# Patient Record
Sex: FEMALE | Race: WHITE | Employment: UNEMPLOYED | URBAN - METROPOLITAN AREA
[De-identification: names, ages, dates, MRNs, and addresses within clinical notes are randomized per-mention and may not be internally consistent; named-entity substitution may affect disease eponyms.]

---

## 2017-01-03 ENCOUNTER — TELEPHONE (OUTPATIENT)
Dept: INTERNAL MEDICINE CLINIC | Age: 37
End: 2017-01-03

## 2017-04-07 ENCOUNTER — OFFICE VISIT (OUTPATIENT)
Dept: INTERNAL MEDICINE CLINIC | Age: 37
End: 2017-04-07

## 2017-04-07 VITALS
OXYGEN SATURATION: 98 % | HEIGHT: 69 IN | WEIGHT: 174.8 LBS | HEART RATE: 74 BPM | DIASTOLIC BLOOD PRESSURE: 62 MMHG | SYSTOLIC BLOOD PRESSURE: 98 MMHG | BODY MASS INDEX: 25.89 KG/M2 | TEMPERATURE: 98.4 F

## 2017-04-07 DIAGNOSIS — J06.9 VIRAL UPPER RESPIRATORY TRACT INFECTION: Primary | ICD-10-CM

## 2017-04-07 PROCEDURE — 99212 OFFICE O/P EST SF 10 MIN: CPT | Performed by: INTERNAL MEDICINE

## 2017-04-07 ASSESSMENT — PATIENT HEALTH QUESTIONNAIRE - PHQ9
1. LITTLE INTEREST OR PLEASURE IN DOING THINGS: 1
2. FEELING DOWN, DEPRESSED OR HOPELESS: 1
SUM OF ALL RESPONSES TO PHQ QUESTIONS 1-9: 2
SUM OF ALL RESPONSES TO PHQ9 QUESTIONS 1 & 2: 2

## 2017-11-03 ENCOUNTER — OFFICE VISIT (OUTPATIENT)
Dept: PSYCHOLOGY | Age: 37
End: 2017-11-03

## 2017-11-03 DIAGNOSIS — F41.9 ANXIETY: Primary | ICD-10-CM

## 2017-11-03 PROCEDURE — 90791 PSYCH DIAGNOSTIC EVALUATION: CPT | Performed by: PSYCHOLOGIST

## 2017-11-03 ASSESSMENT — PATIENT HEALTH QUESTIONNAIRE - PHQ9
2. FEELING DOWN, DEPRESSED OR HOPELESS: 2
SUM OF ALL RESPONSES TO PHQ QUESTIONS 1-9: 13
5. POOR APPETITE OR OVEREATING: 0
SUM OF ALL RESPONSES TO PHQ9 QUESTIONS 1 & 2: 3
8. MOVING OR SPEAKING SO SLOWLY THAT OTHER PEOPLE COULD HAVE NOTICED. OR THE OPPOSITE, BEING SO FIGETY OR RESTLESS THAT YOU HAVE BEEN MOVING AROUND A LOT MORE THAN USUAL: 2
4. FEELING TIRED OR HAVING LITTLE ENERGY: 1
3. TROUBLE FALLING OR STAYING ASLEEP: 3
1. LITTLE INTEREST OR PLEASURE IN DOING THINGS: 1
6. FEELING BAD ABOUT YOURSELF - OR THAT YOU ARE A FAILURE OR HAVE LET YOURSELF OR YOUR FAMILY DOWN: 2
10. IF YOU CHECKED OFF ANY PROBLEMS, HOW DIFFICULT HAVE THESE PROBLEMS MADE IT FOR YOU TO DO YOUR WORK, TAKE CARE OF THINGS AT HOME, OR GET ALONG WITH OTHER PEOPLE: 2
7. TROUBLE CONCENTRATING ON THINGS, SUCH AS READING THE NEWSPAPER OR WATCHING TELEVISION: 1
9. THOUGHTS THAT YOU WOULD BE BETTER OFF DEAD, OR OF HURTING YOURSELF: 1

## 2017-11-03 ASSESSMENT — ANXIETY QUESTIONNAIRES
1. FEELING NERVOUS, ANXIOUS, OR ON EDGE: 1-SEVERAL DAYS
GAD7 TOTAL SCORE: 7
5. BEING SO RESTLESS THAT IT IS HARD TO SIT STILL: 1-SEVERAL DAYS
4. TROUBLE RELAXING: 1-SEVERAL DAYS
7. FEELING AFRAID AS IF SOMETHING AWFUL MIGHT HAPPEN: 1-SEVERAL DAYS
2. NOT BEING ABLE TO STOP OR CONTROL WORRYING: 1-SEVERAL DAYS
6. BECOMING EASILY ANNOYED OR IRRITABLE: 1-SEVERAL DAYS
3. WORRYING TOO MUCH ABOUT DIFFERENT THINGS: 1-SEVERAL DAYS

## 2017-11-03 NOTE — PROGRESS NOTES
does not drink alcohol. Family History:   Family History   Problem Relation Age of Onset    Thyroid Disease Other     Cancer Paternal Grandmother     Cancer Paternal Grandfather          A:    See S: above. Denied any si/hi risk. F/u with me for couple consult in 2 weeks. PHQ Scores 11/3/2017 4/7/2017   PHQ2 Score 3 2   PHQ9 Score 13 2     Interpretation of Total Score Depression Severity: 1-4 = Minimal depression, 5-9 = Mild depression, 10-14 = Moderate depression, 15-19 = Moderately severe depression, 20-27 = Severe depression    COLIN 7 SCORE 11/3/2017   COLIN-7 Total Score 7     Interpretation of COLIN-7 score: 5-9 = mild anxiety, 10-14 = moderate anxiety, 15+ = severe anxiety. Recommend referral to behavioral health for scores 10 or greater. Diagnosis: Anxiety      Diagnosis Date    Pulmonary embolus ([address] 2016    post partum after first child    Spontaneous miscarriage 2014    at 12 weeks     Problems with primary support group and Problems related to the social environment      Plan:  Pt interventions:    Discussed self-care (sleep, nutrition, rewarding activities, social support, exercise), Discussed benefits of referral for specialty care, Established rapport, Conducted functional assessment and Supportive techniques    Pt Behavioral Change Plan:    1. Return to clinic in 2 weeks with , couple consult, 60 minutes  2. Consider longer term psychotherapy referral, individual or couple  3.  Return to clinic 2 weeks after consult with  for individual appointment, schedule follow up with Dr. Victor Manuel Mars

## 2017-11-03 NOTE — PATIENT INSTRUCTIONS
1. Return to clinic in 2 weeks with , couple consult, 60 minutes  2. Consider longer term psychotherapy referral, individual or couple  3.  Return to clinic 2 weeks after consult with  for individual appointment, schedule follow up with Dr. Ovi Bedolla

## 2017-11-14 ENCOUNTER — OFFICE VISIT (OUTPATIENT)
Dept: PSYCHOLOGY | Age: 37
End: 2017-11-14

## 2017-11-14 DIAGNOSIS — F41.9 ANXIETY: Primary | ICD-10-CM

## 2017-11-14 PROCEDURE — 90847 FAMILY PSYTX W/PT 50 MIN: CPT | Performed by: PSYCHOLOGIST

## 2017-11-14 ASSESSMENT — ANXIETY QUESTIONNAIRES
6. BECOMING EASILY ANNOYED OR IRRITABLE: 2-OVER HALF THE DAYS
1. FEELING NERVOUS, ANXIOUS, OR ON EDGE: 1-SEVERAL DAYS
7. FEELING AFRAID AS IF SOMETHING AWFUL MIGHT HAPPEN: 2-OVER HALF THE DAYS
GAD7 TOTAL SCORE: 10
3. WORRYING TOO MUCH ABOUT DIFFERENT THINGS: 1-SEVERAL DAYS
2. NOT BEING ABLE TO STOP OR CONTROL WORRYING: 1-SEVERAL DAYS
4. TROUBLE RELAXING: 2-OVER HALF THE DAYS
5. BEING SO RESTLESS THAT IT IS HARD TO SIT STILL: 1-SEVERAL DAYS

## 2017-11-14 ASSESSMENT — PATIENT HEALTH QUESTIONNAIRE - PHQ9
SUM OF ALL RESPONSES TO PHQ QUESTIONS 1-9: 13
SUM OF ALL RESPONSES TO PHQ9 QUESTIONS 1 & 2: 4
1. LITTLE INTEREST OR PLEASURE IN DOING THINGS: 2
5. POOR APPETITE OR OVEREATING: 1
6. FEELING BAD ABOUT YOURSELF - OR THAT YOU ARE A FAILURE OR HAVE LET YOURSELF OR YOUR FAMILY DOWN: 2
7. TROUBLE CONCENTRATING ON THINGS, SUCH AS READING THE NEWSPAPER OR WATCHING TELEVISION: 1
8. MOVING OR SPEAKING SO SLOWLY THAT OTHER PEOPLE COULD HAVE NOTICED. OR THE OPPOSITE, BEING SO FIGETY OR RESTLESS THAT YOU HAVE BEEN MOVING AROUND A LOT MORE THAN USUAL: 1
10. IF YOU CHECKED OFF ANY PROBLEMS, HOW DIFFICULT HAVE THESE PROBLEMS MADE IT FOR YOU TO DO YOUR WORK, TAKE CARE OF THINGS AT HOME, OR GET ALONG WITH OTHER PEOPLE: 2
4. FEELING TIRED OR HAVING LITTLE ENERGY: 1
3. TROUBLE FALLING OR STAYING ASLEEP: 2
9. THOUGHTS THAT YOU WOULD BE BETTER OFF DEAD, OR OF HURTING YOURSELF: 1
2. FEELING DOWN, DEPRESSED OR HOPELESS: 2

## 2017-11-14 NOTE — PROGRESS NOTES
Behavioral Health Consultation Follow-Up  Wander Sharif PsyD  Psychologist  11/14/2017  2:02 PM      Time spent with Patient: 60 minutes  This is patient's second  [facility] appointment. Reason for Consult:  Anxiety  Referring Provider: Kareen Bautista MD  [address] [facility], [address] [facility]    Feedback given to PCP. S:    Met with pt (and , Benedict Little). Marital issues are severe. We focused today on couple's readiness for couple therapy treatment. Spent some time focused on clarifying pt and 's understanding of their marital issues. Emphasized that it will be important for both of them to identify ways they each individually make their own changes to improve marriage. Identified potential barriers to couple therapy. Discussed the benefits of couple scheduling consistent \"fun\" together and how couple therapy will support them being able to break habits of arguing over what the root cause is of pt's medical issues and how they can develop a health repair plan when they have conflict. Both pt and  expressed commitment to wanting to work on marriage. [facility] Pt will call to schedule appt with her asap. F/u with me for pt only, individual consult next week.      O:  MSE:    Appearance    alert, cooperative, crying  Appetite abnormal: poor  Sleep disturbance Yes  Fatigue Yes  Loss of pleasure Yes  Impulsive behavior No  Speech    normal rate, normal volume and well articulated  Mood    Anxious  Guilty  Depressed  Worthless  Low self-esteem  Affect    Congruent with full range  Thought Content    intact  Thought Process    linear, goal directed and coherent  Associations    logical connections  Insight    Good  Judgment    Intact  Orientation    oriented to person, place, time, and general circumstances  Memory    recent and remote memory intact  Attention/Concentration    impaired  Morbid ideation Yes  Suicide Assessment    no suicidal anxiety. Recommend referral to behavioral health for scores 10 or greater. Diagnosis:    COLIN      Diagnosis Date    Pulmonary embolus ([address] 2016    post partum after first child    Spontaneous miscarriage 2014    at 12 weeks     Problems with primary support group, Problems related to the social environment and Occupational problems      Plan:  Pt interventions:    Practiced assertive communication, Discussed self-care (sleep, nutrition, rewarding activities, social support, exercise), Discussed benefits of referral for specialty care, Motivational Interviewing to determine importance and readiness for change, Discussed potential barriers to change and Supportive techniques    Pt Behavioral Change Plan:    1. Call Almaz Driver): [phone] for couple therapy  2.  Arleen will return for individual consult for Dr. Elaine Allen next week

## 2017-11-21 ENCOUNTER — OFFICE VISIT (OUTPATIENT)
Dept: PSYCHOLOGY | Age: 37
End: 2017-11-21

## 2017-11-21 DIAGNOSIS — F41.9 ANXIETY: Primary | ICD-10-CM

## 2017-11-21 PROCEDURE — 90832 PSYTX W PT 30 MINUTES: CPT | Performed by: PSYCHOLOGIST

## 2017-11-21 ASSESSMENT — PATIENT HEALTH QUESTIONNAIRE - PHQ9
3. TROUBLE FALLING OR STAYING ASLEEP: 2
9. THOUGHTS THAT YOU WOULD BE BETTER OFF DEAD, OR OF HURTING YOURSELF: 1
7. TROUBLE CONCENTRATING ON THINGS, SUCH AS READING THE NEWSPAPER OR WATCHING TELEVISION: 1
8. MOVING OR SPEAKING SO SLOWLY THAT OTHER PEOPLE COULD HAVE NOTICED. OR THE OPPOSITE, BEING SO FIGETY OR RESTLESS THAT YOU HAVE BEEN MOVING AROUND A LOT MORE THAN USUAL: 1
5. POOR APPETITE OR OVEREATING: 1
6. FEELING BAD ABOUT YOURSELF - OR THAT YOU ARE A FAILURE OR HAVE LET YOURSELF OR YOUR FAMILY DOWN: 2
SUM OF ALL RESPONSES TO PHQ9 QUESTIONS 1 & 2: 3
10. IF YOU CHECKED OFF ANY PROBLEMS, HOW DIFFICULT HAVE THESE PROBLEMS MADE IT FOR YOU TO DO YOUR WORK, TAKE CARE OF THINGS AT HOME, OR GET ALONG WITH OTHER PEOPLE: 2
4. FEELING TIRED OR HAVING LITTLE ENERGY: 2
SUM OF ALL RESPONSES TO PHQ QUESTIONS 1-9: 13
1. LITTLE INTEREST OR PLEASURE IN DOING THINGS: 1
2. FEELING DOWN, DEPRESSED OR HOPELESS: 2

## 2017-11-21 ASSESSMENT — ANXIETY QUESTIONNAIRES
5. BEING SO RESTLESS THAT IT IS HARD TO SIT STILL: 1-SEVERAL DAYS
4. TROUBLE RELAXING: 1-SEVERAL DAYS
GAD7 TOTAL SCORE: 10
1. FEELING NERVOUS, ANXIOUS, OR ON EDGE: 2-OVER HALF THE DAYS
7. FEELING AFRAID AS IF SOMETHING AWFUL MIGHT HAPPEN: 2-OVER HALF THE DAYS
3. WORRYING TOO MUCH ABOUT DIFFERENT THINGS: 1-SEVERAL DAYS
2. NOT BEING ABLE TO STOP OR CONTROL WORRYING: 2-OVER HALF THE DAYS
6. BECOMING EASILY ANNOYED OR IRRITABLE: 1-SEVERAL DAYS

## 2017-11-21 NOTE — PROGRESS NOTES
Behavioral Health Consultation Follow-Up  Estuardo Godoy PsyD  Psychologist  11/21/2017  10:48 AM      Time spent with Patient: 30 minutes  This is patient's third  [facility] appointment. Reason for Consult:  Anxiety  Referring Provider: Wes Birch MD  [address], [address] [facility]    Feedback given to PCP. S:    Went to [place] clinic, showed positive test for anti-body. RNP came back positive, possible connective tissue issues, could be EDS, lupus, RA, fibromyalgia, or sjogren's. Scheduled with Functional medicine in February. Prescribed effexor 37.5 for 2 weeks, then 2 pills to 75 mg, by Dr. Radha Houston ([address] [place] [address]). Pt agreeable to starting this new medication. Understands it will take 4-6 weeks before full therapeutic benefit. Rheumatology: knows really nice provider in [facility], Dr. Coco Mccarthy in [address] [place], [place]    Couple therapy: pt hasn't scheduled new patient appointment with [address] [place] [address] yet but agreed to schedule this over the next week or so.  Pt unsure if she has the energy to do couple treatment but emphasized it is important to her    O:  MSE:    Appearance    alert, cooperative  Appetite okay  Sleep disturbance a bit better  Fatigue a bit better  Loss of pleasure Yes  Impulsive behavior No  Speech    normal rate, normal volume and well articulated  Mood    Feeling a bit of relief with news that there is positive autoimmune test, feeling hopeful  Affect    Congruent with full range  Thought Content    intact  Thought Process    linear, goal directed and coherent  Associations    logical connections  Insight    Good  Judgment    Intact  Orientation    oriented to person, place, time, and general circumstances  Memory    recent and remote memory intact  Attention/Concentration    intact  Morbid ideation No  Suicide Assessment    no suicidal ideation      History:    Medications:   Current Outpatient Prescriptions   Medication Sig Dispense Refill    clonazePAM (KLONOPIN) 0.5 MG tablet Take 0.5 mg by mouth 2 times daily as needed      FLUoxetine (PROZAC) 10 MG tablet Take 10 mg by mouth daily      LORazepam (ATIVAN) 0.5 MG tablet One to two po qid prn anxiety 40 tablet 0    mometasone (NASONEX) 50 MCG/ACT nasal spray 2 sprays by Nasal route daily 1 Inhaler 3    sertraline (ZOLOFT) 50 MG tablet Take 1 tablet by mouth daily 30 tablet 3    midodrine (PROAMATINE) 10 MG tablet Take 1 tablet by mouth 3 times daily 90 tablet 8     No current facility-administered medications for this visit. 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 She has never used smokeless tobacco.  ETOH:   reports that she does not drink alcohol. Family History:   Family History   Problem Relation Age of Onset    Thyroid Disease Other     Cancer Paternal Grandmother     Cancer Paternal Grandfather          A:    Pt in better spirits today, feeling hopeful after visit to [facility] even though felt rheumatologist was unkind in terms of minimizing a recent positive anti-body. Dr. Edelmira Walter (rhematologist from [facility]) prescribed effexor to help with stress. Pt somewhat annoyed by this but agreeable to start this medication. Will start 37.5 effexor for 2 weeks then taper up to 75 mg. More motivational interviewing interventions around pt moving towards engaging in couple therapy. She will call to schedule new couple therapy appt with Almaz Robin this week. Denied any si/hi risk, intent, or plan.      PHQ Scores 11/21/2017 11/14/2017

## 2017-11-21 NOTE — PATIENT INSTRUCTIONS
1. Start effexor 37.5 mg for 2 weeks then 75 mg per Dr. Neymar Fountain [facility] clinic)  2. Continue to slow down any amount  3. Schedule couple consult with Peg Meis before next appointment  4.  Return to clinic for Dr. Victor Manuel Mars in 4 week or sooner if needed

## 2019-04-30 ENCOUNTER — OFFICE VISIT (OUTPATIENT)
Dept: PSYCHOLOGY | Age: 39
End: 2019-04-30
Payer: COMMERCIAL

## 2019-04-30 DIAGNOSIS — F41.9 ANXIETY: Primary | ICD-10-CM

## 2019-04-30 PROCEDURE — 90832 PSYTX W PT 30 MINUTES: CPT | Performed by: PSYCHOLOGIST

## 2019-04-30 ASSESSMENT — ANXIETY QUESTIONNAIRES
3. WORRYING TOO MUCH ABOUT DIFFERENT THINGS: 2-OVER HALF THE DAYS
4. TROUBLE RELAXING: 2-OVER HALF THE DAYS
5. BEING SO RESTLESS THAT IT IS HARD TO SIT STILL: 2-OVER HALF THE DAYS
1. FEELING NERVOUS, ANXIOUS, OR ON EDGE: 2-OVER HALF THE DAYS
2. NOT BEING ABLE TO STOP OR CONTROL WORRYING: 2-OVER HALF THE DAYS
7. FEELING AFRAID AS IF SOMETHING AWFUL MIGHT HAPPEN: 3-NEARLY EVERY DAY
6. BECOMING EASILY ANNOYED OR IRRITABLE: 2-OVER HALF THE DAYS
GAD7 TOTAL SCORE: 15

## 2019-04-30 ASSESSMENT — PATIENT HEALTH QUESTIONNAIRE - PHQ9
SUM OF ALL RESPONSES TO PHQ QUESTIONS 1-9: 2
1. LITTLE INTEREST OR PLEASURE IN DOING THINGS: 1
SUM OF ALL RESPONSES TO PHQ QUESTIONS 1-9: 2
SUM OF ALL RESPONSES TO PHQ9 QUESTIONS 1 & 2: 2
2. FEELING DOWN, DEPRESSED OR HOPELESS: 1

## 2019-04-30 NOTE — PATIENT INSTRUCTIONS
1. Dr Lexis Rankin will reach out to colleagues for couple therapy referral  2. Continue to slow down any amount  3. Go to Maksim Martinez CNP to address clonazepam refill on 5/6, ask [place] Hammock about refill? 4. Go to Dr Gladstone Homans (functional medicine) on 5/10 in [facility]  5.  Return to clinic for Dr Lexis Rankin in 2-4 weeks
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
## 2019-10-23 ENCOUNTER — HOSPITAL ENCOUNTER (OUTPATIENT)
Dept: CT IMAGING | Age: 39
Discharge: HOME | End: 2019-10-23
Payer: COMMERCIAL

## 2019-10-23 DIAGNOSIS — R10.30 LOWER ABDOMINAL PAIN: ICD-10-CM

## 2019-10-23 PROCEDURE — 74177 CT ABD & PELVIS W/CONTRAST: CPT

## 2019-10-23 PROCEDURE — 6360000004 HC RX CONTRAST MEDICATION: Performed by: INTERNAL MEDICINE

## 2019-10-23 RX ADMIN — IOHEXOL 50 ML: 240 INJECTION, SOLUTION INTRATHECAL; INTRAVASCULAR; INTRAVENOUS; ORAL at 15:10

## 2019-10-23 RX ADMIN — IOPAMIDOL 75 ML: 755 INJECTION, SOLUTION INTRAVENOUS at 15:10

## 2020-06-02 ENCOUNTER — OFFICE VISIT (OUTPATIENT)
Dept: ENT CLINIC | Age: 40
End: 2020-06-02
Payer: COMMERCIAL

## 2020-06-02 VITALS
WEIGHT: 156 LBS | TEMPERATURE: 98.2 F | SYSTOLIC BLOOD PRESSURE: 114 MMHG | HEART RATE: 86 BPM | DIASTOLIC BLOOD PRESSURE: 76 MMHG | HEIGHT: 69 IN | BODY MASS INDEX: 23.11 KG/M2

## 2020-06-02 PROCEDURE — 31231 NASAL ENDOSCOPY DX: CPT | Performed by: OTOLARYNGOLOGY

## 2020-06-02 PROCEDURE — 99243 OFF/OP CNSLTJ NEW/EST LOW 30: CPT | Performed by: OTOLARYNGOLOGY

## 2020-06-02 ASSESSMENT — ENCOUNTER SYMPTOMS
VOICE CHANGE: 0
SINUS PAIN: 0
COLOR CHANGE: 0
TROUBLE SWALLOWING: 0
DIARRHEA: 0
SORE THROAT: 0
SINUS PRESSURE: 1
BLOOD IN STOOL: 0
CHOKING: 0
STRIDOR: 0
EYE ITCHING: 0
FACIAL SWELLING: 0
CONSTIPATION: 0
COUGH: 0
SHORTNESS OF BREATH: 0
WHEEZING: 0
VOMITING: 0
BACK PAIN: 0
EYE DISCHARGE: 0
PHOTOPHOBIA: 0
RHINORRHEA: 1
NAUSEA: 0

## 2020-06-02 NOTE — PROGRESS NOTES
[facility]  [address], [address] [facility], [address] [facility]  P: [phone]  F: [phone]       Patient     Mango Mcgregor  1980    ChiefComplaint     Chief Complaint   Patient presents with    Sinus Problem     Patient is here today because she has drainage that runs down the back of her throat, she has a salty taste around her lips and eyes, she has pressure behind her nose along with headaches, he nasal cavities feel very dry,        History of Present Illness     Vero Cowan is a pleasant 46 y.o. female who presents as a new patient today referred for sinusitis issues. She moved to the area couple years ago. Over the past 6 to 8 months she is been having issues with nasal congestion, nasal obstruction, sneezing, postnasal drainage, sinus pressure. She has not been on any antibiotics. She has been on Flonase which she did not tolerate. She only took it for a few days. She has been on Zyrtec regularly which somewhat helped her symptoms. However she discontinued it recently. Her nose feels significantly dry. She has not been able to clear mucus from her nose. Congestion is persistent. She has not had a CT of her sinuses. She has never been allergy tested.     Past Medical History     Past Medical History:   Diagnosis Date    Palpitations     PVCs    Panic disorder     POTS (postural orthostatic tachycardia syndrome) 2016    came on after birth of child and PE    PTSD (post-traumatic stress disorder)     diagnosed after POTs and PE diagnosed    Pulmonary embolus ([address] 2016    post partum after first child    Spontaneous miscarriage 2014    at 12 weeks       Past Surgical History     Past Surgical History:   Procedure Laterality Date     SECTION      DILATION AND CURETTAGE OF UTERUS  2014       Family History     Family History   Problem Relation Age of Onset    Thyroid Disease Other     Cancer Paternal Grandmother     Cancer Paternal Grandfather        Social erythema, left periorbital erythema or laceration. Hair is normal.      Jaw: No trismus. Right Ear: Hearing, tympanic membrane and external ear normal. No decreased hearing noted. No drainage, swelling or tenderness. No middle ear effusion. No mastoid tenderness. Tympanic membrane is not perforated, retracted or bulging. Tympanic membrane has normal mobility. Left Ear: Hearing, tympanic membrane and external ear normal. No decreased hearing noted. No drainage, swelling or tenderness. No middle ear effusion. No mastoid tenderness. Tympanic membrane is not perforated, retracted or bulging. Tympanic membrane has normal mobility. Nose: No nasal deformity, septal deviation, laceration, mucosal edema or rhinorrhea. Right Turbinates: Swollen and pale. Left Turbinates: Swollen and pale. Right Sinus: No maxillary sinus tenderness or frontal sinus tenderness. Left Sinus: No maxillary sinus tenderness or frontal sinus tenderness. Mouth/Throat:      Mouth: Mucous membranes are not pale, not dry and not cyanotic. No lacerations or oral lesions. Dentition: Normal dentition. No dental caries or dental abscesses. Pharynx: Uvula midline. No oropharyngeal exudate, posterior oropharyngeal erythema or uvula swelling. Tonsils: No tonsillar abscesses. Eyes:      General: Lids are normal.         Right eye: No discharge. Left eye: No discharge. Extraocular Movements:      Right eye: Normal extraocular motion and no nystagmus. Left eye: Normal extraocular motion and no nystagmus. Conjunctiva/sclera:      Right eye: No chemosis or exudate. Left eye: No chemosis or exudate. Neck:      Musculoskeletal: Neck supple. Thyroid: No thyroid mass or thyromegaly. Vascular: Normal carotid pulses. Trachea: No tracheal tenderness or tracheal deviation. Cardiovascular:      Rate and Rhythm: Normal rate and regular rhythm.    Pulmonary:      Effort: No month duration. I will see her back to assess improvement of her symptoms. Other options in the future include antihistamine nasal spray, CT of the sinus, allergy testing. 2. Nasal turbinate hypertrophy      3. Chronic sinusitis, unspecified location        Return in about 4 weeks (around 6/30/2020). Portions of this note were dictated using Dragon.  There may be linguistic errors secondary to the use of this program.

## 2020-06-09 ENCOUNTER — TELEPHONE (OUTPATIENT)
Dept: ADMINISTRATIVE | Age: 40
End: 2020-06-09

## 2020-06-09 NOTE — TELEPHONE ENCOUNTER
Submitted PA for Emverm 100MG chewable tablets, Key: [id number]. Medication has been APPROVED through 06/09/2021. Please notify patient. Thank you.

## 2020-09-16 ENCOUNTER — NURSE TRIAGE (OUTPATIENT)
Dept: OTHER | Facility: CLINIC | Age: 40
End: 2020-09-16

## 2020-09-16 NOTE — TELEPHONE ENCOUNTER
Reason for Disposition   Finished taking antibiotics and symptoms are BETTER but are not completely gone    Answer Assessment - Initial Assessment Questions  1. INFECTION: \"What infection is the antibiotic being given for? \"      Cipro  2. ANTIBIOTIC: \"What antibiotic are you taking\" \"How many times per day? \"      BID   3. DURATION: \"When was the antibiotic started? \"      2 weeks ago  4. MAIN CONCERN OR SYMPTOM:  \"What is your main concern right now? \"      Heavy uterus  5. BETTER-SAME-WORSE: Guilherme Frizzle you getting better, staying the same, or getting worse compared to when you first started the antibiotics? \" If getting worse, ask: \"In what way? \"       worse  6. FEVER: \"Do you have a fever? \" If so, ask: \"What is your temperature, how was it measured, and when did it start? \"      no  7. SYMPTOMS: \"Are there any other symptoms you're concerned about? \" If so, ask: \"When did it start? \"      2 weeks ago  8. FOLLOW-UP APPOINTMENT: \"Do you have a follow-up appointment with your doctor? \"      No call back from specialist    Protocols used: INFECTION ON ANTIBIOTIC FOLLOW-UP CALL-ADULT-[place]    Patient called pre-service center [facility]) [place] with red flag complaint. Brief description of triage: pissible UTI    Triage indicates for patient to be seen today in office    Care advice provided, patient verbalizes understanding; denies any other questions or concerns; instructed to call back for any new or worsening symptoms. Writer provided warm transfer to [facility] for appointment scheduling. Please do not respond to the triage nurse through this encounter. Any subsequent communication should be directly with the patient.

## 2020-11-06 ENCOUNTER — OFFICE VISIT (OUTPATIENT)
Dept: PRIMARY CARE CLINIC | Age: 40
End: 2020-11-06
Payer: COMMERCIAL

## 2020-11-06 PROCEDURE — 99211 OFF/OP EST MAY X REQ PHY/QHP: CPT | Performed by: NURSE PRACTITIONER

## 2020-12-03 LAB — SARS-COV-2, NAA: NOT DETECTED

## 2022-05-19 ENCOUNTER — OFFICE VISIT (OUTPATIENT)
Dept: ENT CLINIC | Age: 42
End: 2022-05-19
Payer: COMMERCIAL

## 2022-05-19 VITALS
HEART RATE: 92 BPM | BODY MASS INDEX: 25.62 KG/M2 | DIASTOLIC BLOOD PRESSURE: 84 MMHG | HEIGHT: 69 IN | SYSTOLIC BLOOD PRESSURE: 137 MMHG | WEIGHT: 173 LBS

## 2022-05-19 DIAGNOSIS — L29.9 ITCHING OF EAR: ICD-10-CM

## 2022-05-19 DIAGNOSIS — H69.83 ETD (EUSTACHIAN TUBE DYSFUNCTION), BILATERAL: Primary | ICD-10-CM

## 2022-05-19 DIAGNOSIS — J30.1 SEASONAL ALLERGIC RHINITIS DUE TO POLLEN: ICD-10-CM

## 2022-05-19 PROCEDURE — 99213 OFFICE O/P EST LOW 20 MIN: CPT | Performed by: OTOLARYNGOLOGY

## 2022-05-19 PROCEDURE — 92504 EAR MICROSCOPY EXAMINATION: CPT | Performed by: OTOLARYNGOLOGY

## 2022-05-19 ASSESSMENT — ENCOUNTER SYMPTOMS
PHOTOPHOBIA: 0
VOICE CHANGE: 0
SORE THROAT: 0
EYE REDNESS: 0
DIARRHEA: 0
EYE PAIN: 0
RHINORRHEA: 0
EYE ITCHING: 0
COUGH: 0
FACIAL SWELLING: 0
SINUS PAIN: 0
COLOR CHANGE: 0
CHOKING: 0
SINUS PRESSURE: 0
TROUBLE SWALLOWING: 0
SHORTNESS OF BREATH: 0
STRIDOR: 0
NAUSEA: 0

## 2022-05-19 NOTE — PROGRESS NOTES
[facility]  [address], [address] [place] [address]  P: [phone]  F: [phone]       Patient     Palmer Gonzalez  1980    ChiefComplaint     Chief Complaint   Patient presents with    Ear Problem     Patient is here today because both of her ears feel clogged and itchy with a feeling of fullness       History of Present Illness     Palmer Gonzalez is a pleasant 37 y.o. female known to me presents for issues of ear itching, ear pressure. Has been going on for the past month or 2. She has inability to equalize the pressure of her ears when she pinches and blows. She was recently started on Astelin and fluticasone. She use the Flonase for about 1 week without any significant relief. She just started the Astelin a week or 2 ago. She is taking a Claritin daily as well. She is never been allergy tested. She was placed on a Z-Rocky without any improvement. Denies any tinnitus, hearing loss, spinning sensation.     Past Medical History     Past Medical History:   Diagnosis Date    Palpitations     PVCs    Panic disorder     POTS (postural orthostatic tachycardia syndrome) 2016    came on after birth of child and PE    PTSD (post-traumatic stress disorder)     diagnosed after POTs and PE diagnosed    Pulmonary embolus ([facility] [address] 2016    post partum after first child    Spontaneous miscarriage 2014    at 12 weeks       Past Surgical History     Past Surgical History:   Procedure Laterality Date     SECTION      DILATION AND CURETTAGE OF UTERUS         Family History     Family History   Problem Relation Age of Onset    Thyroid Disease Other     Cancer Paternal Grandmother     Cancer Paternal Grandfather        Social History     Social History     Socioeconomic History    Marital status:      Spouse name: Not on file    Number of children: Not on file    Years of education: Not on file    Highest education level: Not on file   Occupational History    Occupation: accounting /AR     Comment:  w child   Tobacco Use    Smoking status: Never Smoker    Smokeless tobacco: Never Used   Substance and Sexual Activity    Alcohol use: No     Alcohol/week: 0.0 standard drinks    Drug use: No    Sexual activity: Yes   Other Topics Concern    Not on file   Social History Narrative    Not on file     Social Determinants of Health     Financial Resource Strain:     Difficulty of Paying Living Expenses: Not on file   Food Insecurity:     Worried About Running Out of Food in the Last Year: Not on file    Delma of Food in the Last Year: Not on file   Transportation Needs:     Lack of Transportation (Medical): Not on file    Lack of Transportation (Non-Medical):  Not on file   Physical Activity:     Days of Exercise per Week: Not on file    Minutes of Exercise per Session: Not on file   Stress:     Feeling of Stress : Not on file   Social Connections:     Frequency of Communication with Friends and Family: Not on file    Frequency of Social Gatherings with Friends and Family: Not on file    Attends Gnosticism Services: Not on file    Active Member of [address] [place] [address] or Organizations: Not on file    Attends Club or Organization Meetings: Not on file    Marital Status: Not on file   Intimate Partner Violence:     Fear of Current or Ex-Partner: Not on file    Emotionally Abused: Not on file    Physically Abused: Not on file    Sexually Abused: Not on file   Housing Stability:     Unable to Pay for Housing in the Last Year: Not on file    Number of [place] in the Last Year: Not on file    Unstable Housing in the Last Year: Not on file       Allergies     Allergies   Allergen Reactions    Florinef [Fludrocortisone]      headaches    Midodrine      Slowed heart rate down 40s    Toprol Xl [Metoprolol]      Lowered bp too much       Medications     Current Outpatient Medications   Medication Sig Dispense Refill    fluticasone (FLONASE) 50 MCG/ACT nasal spray 2 sprays by Each Nostril route daily 16 g 5    azelastine (ASTELIN) 0.1 % nasal spray 1 spray by Nasal route 2 times daily Use in each nostril as directed 30 mL 5    clonazePAM (KLONOPIN) 0.5 MG tablet Take 1 tablet by mouth daily as needed for Anxiety for up to 30 days. 20 tablet 0     No current facility-administered medications for this visit. Review of Systems     Review of Systems   Constitutional: Negative for chills, fatigue and fever. HENT: Positive for congestion and ear pain. Negative for ear discharge, facial swelling, hearing loss, nosebleeds, postnasal drip, rhinorrhea, sinus pressure, sinus pain, sneezing, sore throat, tinnitus, trouble swallowing and voice change. Eyes: Negative for photophobia, pain, redness, itching and visual disturbance. Respiratory: Negative for cough, choking, shortness of breath and stridor. Gastrointestinal: Negative for diarrhea and nausea. Musculoskeletal: Negative for neck pain and neck stiffness. Skin: Negative for color change and rash. Neurological: Negative for dizziness, facial asymmetry and light-headedness. Hematological: Negative for adenopathy. Psychiatric/Behavioral: Negative for agitation and confusion. PhysicalExam     Vitals:    05/19/22 1051   BP: 137/84   Pulse: 92       Physical Exam  Constitutional:       Appearance: She is well-developed. HENT:      Head: Normocephalic and atraumatic. Jaw: No trismus. Right Ear: Tympanic membrane, ear canal and external ear normal. No drainage. No middle ear effusion. Tympanic membrane is not perforated. Left Ear: Tympanic membrane, ear canal and external ear normal. No drainage. No middle ear effusion. Tympanic membrane is not perforated. Nose: No septal deviation, mucosal edema or rhinorrhea. Mouth/Throat:      Dentition: Normal dentition. Pharynx: Uvula midline. No oropharyngeal exudate. Eyes:      General: No scleral icterus.         Right eye: No discharge. Left eye: No discharge. Pupils: Pupils are equal, round, and reactive to light. Neck:      Thyroid: No thyromegaly. Trachea: Phonation normal. No tracheal deviation. Pulmonary:      Effort: Pulmonary effort is normal. No respiratory distress. Breath sounds: No stridor. Musculoskeletal:      Cervical back: Neck supple. Lymphadenopathy:      Cervical: No cervical adenopathy. Skin:     General: Skin is warm and dry. Neurological:      Mental Status: She is alert and oriented to person, place, and time. Cranial Nerves: No cranial nerve deficit. Psychiatric:         Behavior: Behavior normal.           Procedure     Otomicroscopy    An operating microscope was utilized to visualize the external auditory canals using a 4mm speculum. The external auditory canals are clear. The tympanic membrane is intact. Ossicles appear intact. No fluid visualized in the middle ear. Assessment and Plan     1. ETD (Eustachian tube dysfunction), bilateral  Patient's symptoms are most consistent with eustachian tube dysfunction secondary to allergic rhinitis. Ear microscopy today reveals no evidence of dermatitis of the external canal skin. I recommended she do fluticasone, Astelin, Claritin daily for at least 1 month to assess for improvement of symptoms. Additionally recommend brief use of over-the-counter Afrin and Sudafed. Proper administration risk medication discussed. Follow-up 1 month. If symptoms persist, consider allergy testing, eustachian to balloon dilation, PE tube placement. 2. Seasonal allergic rhinitis due to pollen      3. Itching of ear        Return in about 4 weeks (around 6/16/2022). Portions of this note were dictated using Dragon.  There may be linguistic errors secondary to the use of this program.

## 2022-05-19 NOTE — PATIENT INSTRUCTIONS
Afrin - 2 sprays each nostril twice daily, do not take for more than three days  Sudafed - take as directed, no longer than 3 day